# Patient Record
Sex: FEMALE | Race: BLACK OR AFRICAN AMERICAN | NOT HISPANIC OR LATINO | Employment: FULL TIME | URBAN - METROPOLITAN AREA
[De-identification: names, ages, dates, MRNs, and addresses within clinical notes are randomized per-mention and may not be internally consistent; named-entity substitution may affect disease eponyms.]

---

## 2018-10-16 ENCOUNTER — OFFICE VISIT (OUTPATIENT)
Dept: OBSTETRICS AND GYNECOLOGY | Facility: CLINIC | Age: 33
End: 2018-10-16

## 2018-10-16 VITALS
WEIGHT: 284 LBS | DIASTOLIC BLOOD PRESSURE: 93 MMHG | HEART RATE: 83 BPM | SYSTOLIC BLOOD PRESSURE: 139 MMHG | HEIGHT: 64 IN | BODY MASS INDEX: 48.49 KG/M2

## 2018-10-16 DIAGNOSIS — N92.6 IRREGULAR BLEEDING: ICD-10-CM

## 2018-10-16 DIAGNOSIS — E66.01 MORBID OBESITY WITH BODY MASS INDEX OF 45.0-49.9 IN ADULT (HCC): ICD-10-CM

## 2018-10-16 DIAGNOSIS — Z01.419 PAP SMEAR, AS PART OF ROUTINE GYNECOLOGICAL EXAMINATION: Primary | ICD-10-CM

## 2018-10-16 PROCEDURE — 99213 OFFICE O/P EST LOW 20 MIN: CPT | Performed by: OBSTETRICS & GYNECOLOGY

## 2018-10-16 PROCEDURE — 99395 PREV VISIT EST AGE 18-39: CPT | Performed by: OBSTETRICS & GYNECOLOGY

## 2018-10-16 NOTE — PROGRESS NOTES
Subjective   Lynn Agrawal is a 32 y.o. female  0.  Last annual 3y, last pap 3y, last mammogram 0, last colonoscopy 0.  Cc: Annual exam  History of Present Illness  Has an occasional episodes of vaginal spotting despite the fact she had a hysterectomy done in  with unilateral  salpingo-oophorectomy and bleeding is not associated with any traumatic activities including coitus and she states that sometimes associated with stress.  Patient denies any  or GI symptomatology.  The following portions of the patient's history were reviewed and updated as appropriate: allergies, current medications, past family history, past medical history, past social history, past surgical history and problem list.    Review of Systems   Genitourinary: Positive for vaginal bleeding.   All other systems reviewed and are negative.        Past Medical History:   Diagnosis Date   • History of thrombocytopenia      Menstrual History:  OB History      Para Term  AB Living    0 0 0 0 0 0    SAB TAB Ectopic Molar Multiple Live Births    0 0 0 0 0 0         Menarche age: 8  No LMP recorded. Patient has had a hysterectomy.       Past Surgical History:   Procedure Laterality Date   • ANKLE SURGERY     • TOTAL ABDOMINAL HYSTERECTOMY WITH SALPINGO OOPHORECTOMY Right     For fibroids with postoperative and intraoperative thrombocytopenia     OB History      Para Term  AB Living    0 0 0 0 0 0    SAB TAB Ectopic Molar Multiple Live Births    0 0 0 0 0 0        Family History   Problem Relation Age of Onset   • Lung cancer Paternal Grandmother    • Cancer Paternal Aunt         unknown   • Fibroids Mother      History   Smoking Status   • Former Smoker   • Quit date: 10/16/2007   Smokeless Tobacco   • Never Used     Comment: Social smoker     History   Alcohol Use No     Health Maintenance   Topic Date Due   • ANNUAL PHYSICAL  1988   • TDAP/TD VACCINES (1 - Tdap) 2004   • INFLUENZA VACCINE  2018  "  • PAP SMEAR  10/16/2018     No current outpatient prescriptions on file.  Sexual History: Active  STD: Negative     Objective   Vitals:    10/16/18 1308   BP: 139/93   Pulse: 83   Weight: 129 kg (284 lb)   Height: 162.6 cm (64\")     Physical Exam   Constitutional: She is oriented to person, place, and time. She appears well-developed and well-nourished.   HENT:   Head: Normocephalic.   Eyes: Pupils are equal, round, and reactive to light.   Neck: Normal range of motion. No thyromegaly present.   Cardiovascular: Normal rate, regular rhythm, normal heart sounds and intact distal pulses.    Pulmonary/Chest: Effort normal and breath sounds normal. No respiratory distress. She exhibits no tenderness. Right breast exhibits no inverted nipple, no mass, no nipple discharge, no skin change and no tenderness. Left breast exhibits no inverted nipple, no mass, no nipple discharge, no skin change and no tenderness. Breasts are symmetrical.   Abdominal: Soft. Bowel sounds are normal. Hernia confirmed negative in the right inguinal area and confirmed negative in the left inguinal area.   Genitourinary: Vagina normal. No breast tenderness or discharge. Pelvic exam was performed with patient supine. There is no rash, tenderness, lesion or injury on the right labia. There is no rash, tenderness, lesion or injury on the left labia. Right adnexum displays no mass, no tenderness and no fullness. Left adnexum displays no mass, no tenderness and no fullness. No bleeding in the vagina.   Genitourinary Comments: Cervix uterus are surgically absent.  [place] of the vagina could not be visualized due to obesity.  No lesions were palpable and there was no bleeding on a vigorous Pap smear specimen.  No vaginal lesions were identified   Lymphadenopathy:     She has no cervical adenopathy.        Right: No inguinal adenopathy present.        Left: No inguinal adenopathy present.   Neurological: She is alert and oriented to person, place, and time. " She has normal reflexes.   Skin: Skin is warm and dry.   Psychiatric: She has a normal mood and affect. Her behavior is normal. Judgment and thought content normal.   Vitals reviewed.        Assessment/Plan   Lynn was seen today for annual exam.    Diagnoses and all orders for this visit:    Pap smear, as part of routine gynecological examination  -     IGP, Apt HPV,rfx 16 / 18,45    Irregular bleeding    Morbid obesity with body mass index of 45.0-49.9 in adult (CMS/[facility])      Patient was counseled on breast self-examination, vaginal bleeding (advised to call should it occur).  STD prophylaxis

## 2018-10-18 LAB
CYTOLOGIST CVX/VAG CYTO: NORMAL
CYTOLOGY CVX/VAG DOC THIN PREP: NORMAL
DX ICD CODE: NORMAL
HIV 1 & 2 AB SER-IMP: NORMAL
HPV I/H RISK 4 DNA CVX QL PROBE+SIG AMP: NEGATIVE
OTHER STN SPEC: NORMAL
PATH REPORT.FINAL DX SPEC: NORMAL
STAT OF ADQ CVX/VAG CYTO-IMP: NORMAL